# Patient Record
Sex: FEMALE | Race: WHITE | Employment: OTHER | URBAN - METROPOLITAN AREA
[De-identification: names, ages, dates, MRNs, and addresses within clinical notes are randomized per-mention and may not be internally consistent; named-entity substitution may affect disease eponyms.]

---

## 2020-09-22 ENCOUNTER — OFFICE VISIT (OUTPATIENT)
Dept: ORTHOPEDIC SURGERY | Age: 65
End: 2020-09-22
Payer: MEDICARE

## 2020-09-22 VITALS — RESPIRATION RATE: 17 BRPM | WEIGHT: 205 LBS | HEIGHT: 70 IN | BODY MASS INDEX: 29.35 KG/M2

## 2020-09-22 PROBLEM — S60.459A FOREIGN BODY OF FINGER OF RIGHT HAND: Status: ACTIVE | Noted: 2020-09-22

## 2020-09-22 PROBLEM — M19.049 HAND ARTHRITIS: Status: ACTIVE | Noted: 2020-09-22

## 2020-09-22 PROCEDURE — 1123F ACP DISCUSS/DSCN MKR DOCD: CPT | Performed by: ORTHOPAEDIC SURGERY

## 2020-09-22 PROCEDURE — 3017F COLORECTAL CA SCREEN DOC REV: CPT | Performed by: ORTHOPAEDIC SURGERY

## 2020-09-22 PROCEDURE — 99203 OFFICE O/P NEW LOW 30 MIN: CPT | Performed by: ORTHOPAEDIC SURGERY

## 2020-09-22 PROCEDURE — 4004F PT TOBACCO SCREEN RCVD TLK: CPT | Performed by: ORTHOPAEDIC SURGERY

## 2020-09-22 PROCEDURE — G8400 PT W/DXA NO RESULTS DOC: HCPCS | Performed by: ORTHOPAEDIC SURGERY

## 2020-09-22 PROCEDURE — G8417 CALC BMI ABV UP PARAM F/U: HCPCS | Performed by: ORTHOPAEDIC SURGERY

## 2020-09-22 PROCEDURE — G8427 DOCREV CUR MEDS BY ELIG CLIN: HCPCS | Performed by: ORTHOPAEDIC SURGERY

## 2020-09-22 PROCEDURE — 1090F PRES/ABSN URINE INCON ASSESS: CPT | Performed by: ORTHOPAEDIC SURGERY

## 2020-09-22 PROCEDURE — 4040F PNEUMOC VAC/ADMIN/RCVD: CPT | Performed by: ORTHOPAEDIC SURGERY

## 2020-09-22 RX ORDER — ESTRADIOL 0.1 MG/G
CREAM VAGINAL
COMMUNITY
Start: 2020-07-23

## 2020-09-22 RX ORDER — OMEGA-3/DHA/EPA/FISH OIL 60 MG-90MG
500 CAPSULE ORAL 4 TIMES DAILY
COMMUNITY

## 2020-09-22 RX ORDER — HYDROCHLOROTHIAZIDE 12.5 MG/1
CAPSULE, GELATIN COATED ORAL
COMMUNITY
Start: 2020-07-24

## 2020-09-22 RX ORDER — BUPROPION HYDROCHLORIDE 150 MG/1
TABLET ORAL
COMMUNITY
Start: 2020-09-18 | End: 2020-10-21

## 2020-09-22 NOTE — PROGRESS NOTES
Chief Complaint  Hand Pain (NP RT HAND)      History of Present Illness:  Rico Fontaine is a 72 y.o. female. She presents today for a new hand surgery specialty evaluation regarding her right hand. She has had a long history of increasing prominence over the radial aspect of the right thumb at the IP joint which will cause intermittent flareups and some ridging into the nail plate. However, more recently she remembers having a thorn penetrate the dorsum of the right small finger just proximal to the PIP joint around 6 or 8 weeks ago. She thought she got most of the thorn out but can feel a remnant of prominence just proximal to the PIP level on the dorsal aspect. This is very uncomfortable with gripping and with firm pressure. She has not had any recent infectious symptoms. Medical History  Patient's medications, allergies, past medical, surgical, social and family histories were reviewed and updated as appropriate. Review of Systems  Pertinent items are noted in HPI  Denies fever, chills, confusion, bowel/bladder active change. Review of systems reviewed from Patient History Form dated on 9/22/20 and available in the patient's chart under the Media tab. Vital Signs  Vitals:    09/22/20 1314   Resp: 17       General Exam:   Constitutional: Patient is adequately groomed with no evidence of malnutrition  Mental Status: The patient is oriented to time, place and person. The patient's mood and affect are appropriate. Lymphatic: The lymphatic examination bilaterally reveals all areas to be without enlargement or induration. Neurological: The patient has good coordination. There is no weakness or sensory deficit. Hand Examination  Inspection: There is an area of prominence which is visualized and longitudinal in a transverse orientation just proximal to the right small finger PIP level.   There is also prominence consistent with osteoarthritic bone spurring along the dorsal IP region of the right thumb. Palpation: There is tenderness both over the dorsal right thumb IP region and also just proximal to the PIP level of the right small finger. There is no warmth or sign of active infection to the PIP of the small digit. Range of Motion: Full range of motion of the small finger without triggering. At the right thumb she lacks the final 20 degrees of maximum IP flexion. Strength: Normal    Special Tests: Ligament stressing reveals good integrity at all IP joints    Skin: There are no additional worrisome rashes, ulcerations or lesions. Gait: normal    Circulation: well perfused    Additional Comments:     Additional Examinations:  Left Upper Extremity: Examination of the left upper extremity does not show any tenderness, deformity or injury. Range of motion is unremarkable. There is no gross instability. There are no rashes, ulcerations or lesions. Strength and tone are normal.       Radiology:     X-rays obtained and reviewed in office:  Views 3  Location right hand  Impression x-rays demonstrate no sign of any acute fracture to the small finger with a concentric PIP joint. X-rays do demonstrate prominent bone spur formation along the IP joint dorsally, mostly along the radial aspect      Assessment: Combination of likely retained foreign body to the right small finger, just proximal to the PIP joint, and right thumb dorsal bone spur and arthritis    Impression:   Encounter Diagnoses   Name Primary?  Hand pain, right Yes    Hand arthritis     Foreign body of finger of right hand, initial encounter        Office Procedures:  Orders Placed This Encounter   Procedures    XR HAND RIGHT (MIN 3 VIEWS)     Order Specific Question:   Reason for exam:     Answer:   pain       Treatment Plan: I discussed the options with the patient with respect to each diagnosis.   At this juncture I do feel be shepherd to consider surgical exploration with excision of any retained foreign material or foreign body granulation tissue. At the same setting, she would like me to move forward with excision of the dorsal bone spur along the radial aspect of the right thumb IP joint. She would like to have some sedation at the time of surgery. She does understand the relative chance of recurrence with bone spur formation in settings of osteoarthritis. We discussed the risks, benefits, limitations, alternatives, and postop recovery following the proposed procedure. We will begin the process of scheduling surgery if there are no other preoperative medical contraindications. Please note that this transcription was created using voice recognition software. Any errors are unintentional and may be due to voice recognition transcription.

## 2020-10-12 ENCOUNTER — TELEPHONE (OUTPATIENT)
Dept: ORTHOPEDIC SURGERY | Age: 65
End: 2020-10-12

## 2020-10-12 NOTE — TELEPHONE ENCOUNTER
Auth: NPR  Date: 10/26/2020  Reference # None  Spoke with: None  Type of SX: Outpatient  Location: [facility]  CPT [id number], [id number]   SX area: Rt hand  Insurance: Medicare A&B

## 2020-10-20 ENCOUNTER — OFFICE VISIT (OUTPATIENT)
Dept: PRIMARY CARE CLINIC | Age: 65
End: 2020-10-20
Payer: MEDICARE

## 2020-10-20 PROCEDURE — 99211 OFF/OP EST MAY X REQ PHY/QHP: CPT | Performed by: NURSE PRACTITIONER

## 2020-10-20 PROCEDURE — G8417 CALC BMI ABV UP PARAM F/U: HCPCS | Performed by: NURSE PRACTITIONER

## 2020-10-20 PROCEDURE — G8428 CUR MEDS NOT DOCUMENT: HCPCS | Performed by: NURSE PRACTITIONER

## 2020-10-20 NOTE — PROGRESS NOTES
Barbara Gallagher received a viral test for COVID-19. They were educated on isolation and quarantine as appropriate. For any symptoms, they were directed to seek care from their PCP, given contact information to establish with a doctor, directed to an urgent care or the emergency room.

## 2020-10-20 NOTE — PATIENT INSTRUCTIONS

## 2020-10-22 LAB — SARS-COV-2, NAA: NOT DETECTED

## 2020-10-22 NOTE — RESULT ENCOUNTER NOTE

## 2020-10-23 ENCOUNTER — ANESTHESIA EVENT (OUTPATIENT)
Dept: OPERATING ROOM | Age: 65
End: 2020-10-23
Payer: MEDICARE

## 2020-10-25 NOTE — H&P
daily      Irbesartan (AVAPRO PO) Take 150 mg by mouth daily       latanoprost (XALATAN) 0.005 % ophthalmic solution Place 1 drop into both eyes nightly       levothyroxine (SYNTHROID) 100 MCG tablet Take 75 mcg by mouth daily          The patient was counseled at length about the risks of benita Covid-19 during their perioperative period and any recovery window from their procedure. The patient was made aware that benita Covid-19  may worsen their prognosis for recovering from their procedure  and lend to a higher morbidity and/or mortality risk. All material risks, benefits, and reasonable alternatives including postponing the procedure were discussed. The patient does wish to proceed with the procedure at this time.           Rajan García 134

## 2020-10-25 NOTE — ANESTHESIA PRE PROCEDURE
as needed for Pain   Brimonidine Tartrate (ALPHAGAN P OP) Apply to eye daily Indications: to both eyes   FLUTICASONE PROPIONATE, NASAL, NA by Nasal route as needed   vitamin E 400 UNIT capsule Take 400 Units by mouth every other day Indications: occaisionally rubs on face   Glucosamine-Chondroitin (GLUCOSAMINE CHONDR COMPLEX PO) Take 1 tablet by mouth daily   Probiotic Product (PROBIOTIC DAILY PO) Take 1 tablet by mouth daily   Irbesartan (AVAPRO PO) Take 150 mg by mouth daily    latanoprost (XALATAN) 0.005 % ophthalmic soln Place 1 drop into both eyes nightly    levothyroxine (SYNTHROID) 100 MCG tablet Take 75 mcg by mouth daily        Allergies:     Cipro Xr      REDNESS AND ITCHING AT IV SITE    Codeine Nausea And Vomiting     Problem List:     Diverticulitis large intestine K57.32    Diverticulitis of colon K57.32    Nephrolithiasis N20.0    Hypothyroid E03.9    Glaucoma H40.9    Hand arthritis M19.049    Foreign body of finger of right hand S60.459A     Past Medical History:     Bowel obstruction (HCC)     tendency to twist and obstruct due to scar tissue    CHF (congestive heart failure) (HCC)     narcotic induced    Diverticulitis of colon     Glaucoma     Hypertension     Kidney stone     Thyroid disease      Past Surgical History:     ABDOMINAL EXPLORATION SURGERY  11    exploratory laparotomy lysis of adhesions sigmoid colectomy appendectomy and cholecystectomy with epidural for pain control     SECTION      COLONOSCOPY  2016    tics    OTHER SURGICAL HISTORY  2011    cysto r ureteroscopy stone extraction and stent placement    WISDOM TOOTH EXTRACTION       Social History:     Smoking status: Never Smoker    Smokeless tobacco: Never Used   Substance Use Topics    Alcohol use:  Yes     Alcohol/week: 0.0 - 1.0 standard drinks     Vital Signs (Current):     BP: 132/69  Pulse: 68    Resp: 16  SpO2: 98    Temp: 97.2 °F (36.2 °C)    Height: 5' 9\" (1.753 m)  (10/26/20)  Weight: 215 lb (97.5 kg)  (10/26/20)    BMI: 31.8            BP Readings from Last 3 Encounters:   08/24/16 110/65     NPO Status: >8 hrs                     BMI:   Wt Readings from Last 3 Encounters:   09/22/20 205 lb (93 kg)   08/24/16 205 lb (93 kg)   04/07/11 193 lb (87.5 kg)     Body mass index is 31.75 kg/m². POC Tests: -19 Screening (If Applicable): COVID19 NOT DETECTED 10/20/2020     Anesthesia Evaluation  Patient summary reviewed and Nursing notes reviewed no history of anesthetic complications:   Airway: Mallampati: II  TM distance: >3 FB   Neck ROM: full  Mouth opening: > = 3 FB Dental:          Pulmonary: breath sounds clear to auscultation      (-) COPD, asthma, recent URI, sleep apnea, wheezes and not a current smoker                           Cardiovascular:  Exercise tolerance: good (>4 METS),   (+) hypertension:,     (-) past MI,  angina,  DENTON and murmur    ECG reviewed  Rhythm: regular  Rate: normal  Echocardiogram reviewed                  Neuro/Psych:      (-) seizures, TIA and CVA           GI/Hepatic/Renal:   (+) GERD: well controlled, renal disease: kidney stones,      (-) hepatitis and liver disease       Endo/Other:    (+) hypothyroidism: arthritis: OA., .    (-) diabetes mellitus               Abdominal:           Vascular:     - DVT and PE. Anesthesia Plan      MAC and TIVA     ASA 3       Induction: intravenous. MIPS: Prophylactic antiemetics administered. Anesthetic plan and risks discussed with patient. Plan discussed with CRNA.             Vidal Beckman MD

## 2020-10-25 NOTE — OP NOTE
[facility]  Procedure Note  [facility] ARIS Gooden  10/26/2020    Pre-operative Diagnosis:Right Thumb Bone Spur; Right Small Finger Dorsal PIP Foreign Body    Post-operative Diagnosis: Same    Procedure:  1. Excision Right Thumb  bone spur      2. Excision Right Small Finger Dorsal PIP Foreign Body    Surgeon: Scar LAWSON    Anesthesia:  Local/MAC    Complications:  None    EBL: less than 10cc      INDICATIONS FOR PROCEDURE: The patient has clinical evidence of right thumb dorsal bone spur and right small finger foreign body and has failed appropriate conservative management. The patient understands the relevant risks, benefits, limitations, chance of recurrence and healing process of the procedure. PROCEDURE The patient was brought to the operating room and anesthetized with [address] anesthesia. The identified and marked extremity was then prepped and draped in a standard fashion. The finger or arm was exsanguinated with a tourniquet. A standard incision was made longitudinally at the right thumb over the dorsal prominence. Dissection carried down through skin, subcutaneous tissue, and tendinous structures were protected carefully. Dissection was performed around the ganglion. The cyst and its stalk was then excised carefully, with attention paid to protect the tendon and nail matrix at all times. The cyst was then sent for formal pathology. A dorsal bone spur was then identified and carefully excised with a combination of #69 [place] blade and small rongeurs. Improved, smoothed DIP joint contour was achieved. No further cyst material or other abnormalities were seen. Next, a standard separate extensile incision was made longitudinally over the right small finger prominence. Dissection carried down through skin, subcutaneous tissue, and neurovascular and tendinous structures were protected carefully.   Dissection carried down to the deep layers beneath the subcutaneous tissue. Dissection was performed around the mass. The structure was then excised carefully, with attention paid to protect the tendons and neurovascular structures at all times. It had features of likely remnant of plant thorn and reactive tissue surrounding. The extensor tendon appeared to remain intact. The mass was then sent for formal pathology. No further pathologic material or other abnormalities were seen. The tourniquet was released and hemostasis achieved with bipolar electrocautery. Each wound was irrigated with sterile saline. Skin was closed with nylon suture. A soft, bulky dressing and splint was applied and the patient transported to the recovery area in stable condition with good perfusion to all fingertips.           Rajan García 134

## 2020-10-26 ENCOUNTER — HOSPITAL ENCOUNTER (OUTPATIENT)
Age: 65
Setting detail: OUTPATIENT SURGERY
Discharge: HOME | End: 2020-10-26
Attending: ORTHOPAEDIC SURGERY | Admitting: ORTHOPAEDIC SURGERY
Payer: MEDICARE

## 2020-10-26 ENCOUNTER — ANESTHESIA (OUTPATIENT)
Dept: OPERATING ROOM | Age: 65
End: 2020-10-26
Payer: MEDICARE

## 2020-10-26 VITALS
OXYGEN SATURATION: 95 % | HEIGHT: 69 IN | WEIGHT: 215 LBS | BODY MASS INDEX: 31.84 KG/M2 | HEART RATE: 59 BPM | RESPIRATION RATE: 16 BRPM | DIASTOLIC BLOOD PRESSURE: 73 MMHG | TEMPERATURE: 97 F | SYSTOLIC BLOOD PRESSURE: 127 MMHG

## 2020-10-26 VITALS
SYSTOLIC BLOOD PRESSURE: 125 MMHG | OXYGEN SATURATION: 96 % | DIASTOLIC BLOOD PRESSURE: 67 MMHG | RESPIRATION RATE: 7 BRPM

## 2020-10-26 PROCEDURE — 2500000003 HC RX 250 WO HCPCS: Performed by: NURSE ANESTHETIST, CERTIFIED REGISTERED

## 2020-10-26 PROCEDURE — 3700000000 HC ANESTHESIA ATTENDED CARE: Performed by: ORTHOPAEDIC SURGERY

## 2020-10-26 PROCEDURE — 88311 DECALCIFY TISSUE: CPT

## 2020-10-26 PROCEDURE — 7100000010 HC PHASE II RECOVERY - FIRST 15 MIN: Performed by: ORTHOPAEDIC SURGERY

## 2020-10-26 PROCEDURE — 3600000003 HC SURGERY LEVEL 3 BASE: Performed by: ORTHOPAEDIC SURGERY

## 2020-10-26 PROCEDURE — 3700000001 HC ADD 15 MINUTES (ANESTHESIA): Performed by: ORTHOPAEDIC SURGERY

## 2020-10-26 PROCEDURE — 6360000002 HC RX W HCPCS: Performed by: ORTHOPAEDIC SURGERY

## 2020-10-26 PROCEDURE — 88304 TISSUE EXAM BY PATHOLOGIST: CPT

## 2020-10-26 PROCEDURE — 2580000003 HC RX 258: Performed by: ORTHOPAEDIC SURGERY

## 2020-10-26 PROCEDURE — 7100000011 HC PHASE II RECOVERY - ADDTL 15 MIN: Performed by: ORTHOPAEDIC SURGERY

## 2020-10-26 PROCEDURE — 2709999900 HC NON-CHARGEABLE SUPPLY: Performed by: ORTHOPAEDIC SURGERY

## 2020-10-26 PROCEDURE — 3600000013 HC SURGERY LEVEL 3 ADDTL 15MIN: Performed by: ORTHOPAEDIC SURGERY

## 2020-10-26 PROCEDURE — 2580000003 HC RX 258: Performed by: ANESTHESIOLOGY

## 2020-10-26 PROCEDURE — 6360000002 HC RX W HCPCS: Performed by: NURSE ANESTHETIST, CERTIFIED REGISTERED

## 2020-10-26 PROCEDURE — 2500000003 HC RX 250 WO HCPCS: Performed by: ORTHOPAEDIC SURGERY

## 2020-10-26 RX ORDER — IBUPROFEN 800 MG/1
800 TABLET ORAL EVERY 8 HOURS PRN
Qty: 60 TABLET | Refills: 1 | Status: SHIPPED | OUTPATIENT
Start: 2020-10-26

## 2020-10-26 RX ORDER — MIDAZOLAM HYDROCHLORIDE 1 MG/ML
INJECTION INTRAMUSCULAR; INTRAVENOUS PRN
Status: DISCONTINUED | OUTPATIENT
Start: 2020-10-26 | End: 2020-10-26 | Stop reason: SDUPTHER

## 2020-10-26 RX ORDER — MAGNESIUM HYDROXIDE 1200 MG/15ML
LIQUID ORAL CONTINUOUS PRN
Status: COMPLETED | OUTPATIENT
Start: 2020-10-26 | End: 2020-10-26

## 2020-10-26 RX ORDER — OXYCODONE HYDROCHLORIDE 5 MG/1
5 TABLET ORAL EVERY 8 HOURS PRN
Qty: 10 TABLET | Refills: 0 | Status: SHIPPED | OUTPATIENT
Start: 2020-10-26 | End: 2020-11-02

## 2020-10-26 RX ORDER — LIDOCAINE HYDROCHLORIDE 20 MG/ML
INJECTION, SOLUTION INFILTRATION; PERINEURAL PRN
Status: DISCONTINUED | OUTPATIENT
Start: 2020-10-26 | End: 2020-10-26 | Stop reason: SDUPTHER

## 2020-10-26 RX ORDER — PROPOFOL 10 MG/ML
INJECTION, EMULSION INTRAVENOUS PRN
Status: DISCONTINUED | OUTPATIENT
Start: 2020-10-26 | End: 2020-10-26 | Stop reason: SDUPTHER

## 2020-10-26 RX ORDER — SODIUM CHLORIDE, SODIUM LACTATE, POTASSIUM CHLORIDE, CALCIUM CHLORIDE 600; 310; 30; 20 MG/100ML; MG/100ML; MG/100ML; MG/100ML
INJECTION, SOLUTION INTRAVENOUS CONTINUOUS
Status: DISCONTINUED | OUTPATIENT
Start: 2020-10-26 | End: 2020-10-26 | Stop reason: HOSPADM

## 2020-10-26 RX ORDER — FENTANYL CITRATE 50 UG/ML
INJECTION, SOLUTION INTRAMUSCULAR; INTRAVENOUS PRN
Status: DISCONTINUED | OUTPATIENT
Start: 2020-10-26 | End: 2020-10-26 | Stop reason: SDUPTHER

## 2020-10-26 RX ORDER — BUPIVACAINE HYDROCHLORIDE 5 MG/ML
INJECTION, SOLUTION EPIDURAL; INTRACAUDAL PRN
Status: DISCONTINUED | OUTPATIENT
Start: 2020-10-26 | End: 2020-10-26 | Stop reason: ALTCHOICE

## 2020-10-26 RX ADMIN — LIDOCAINE HYDROCHLORIDE 60 MG: 20 INJECTION, SOLUTION INFILTRATION; PERINEURAL at 07:33

## 2020-10-26 RX ADMIN — FENTANYL CITRATE 100 MCG: 50 INJECTION INTRAMUSCULAR; INTRAVENOUS at 07:29

## 2020-10-26 RX ADMIN — CEFAZOLIN 2 G: 10 INJECTION, POWDER, FOR SOLUTION INTRAVENOUS at 07:28

## 2020-10-26 RX ADMIN — SODIUM CHLORIDE, POTASSIUM CHLORIDE, SODIUM LACTATE AND CALCIUM CHLORIDE: 600; 310; 30; 20 INJECTION, SOLUTION INTRAVENOUS at 07:06

## 2020-10-26 RX ADMIN — MIDAZOLAM HYDROCHLORIDE 2 MG: 2 INJECTION, SOLUTION INTRAMUSCULAR; INTRAVENOUS at 07:28

## 2020-10-26 RX ADMIN — PROPOFOL 100 MG: 10 INJECTION, EMULSION INTRAVENOUS at 07:33

## 2020-10-26 ASSESSMENT — PULMONARY FUNCTION TESTS
PIF_VALUE: 0
PIF_VALUE: 2
PIF_VALUE: 1
PIF_VALUE: 0
PIF_VALUE: 2
PIF_VALUE: 0
PIF_VALUE: 2
PIF_VALUE: 0
PIF_VALUE: 0
PIF_VALUE: 1

## 2020-10-26 ASSESSMENT — PAIN SCALES - GENERAL
PAINLEVEL_OUTOF10: 0

## 2020-10-26 ASSESSMENT — LIFESTYLE VARIABLES: SMOKING_STATUS: 0

## 2020-10-26 ASSESSMENT — PAIN DESCRIPTION - DESCRIPTORS: DESCRIPTORS: DISCOMFORT

## 2020-10-26 ASSESSMENT — PAIN - FUNCTIONAL ASSESSMENT: PAIN_FUNCTIONAL_ASSESSMENT: 0-10

## 2020-10-26 NOTE — ANESTHESIA POSTPROCEDURE EVALUATION
Department of Anesthesiology  Postprocedure Note    Patient: Agustin Carmichael  MRN: [id number]  YOB: 1955  Date of evaluation: 10/26/2020    Procedure Summary     Date:  10/26/20 Room / Location:  [facility] Adan Mcclendon 01 / [facility]    Anesthesia Start:  3145 Anesthesia Stop:  0801    Procedure:  EXCISION FOREIGN BODY RIGHT DORSAL SMALL FINGER AT PROXIMAL INTERPHALANGEAL JOINT, EXCISION DORSAL BONE SPUR RIGHT THUMB (Right Hand) Diagnosis:       Superficial foreign body of finger, initial encounter      (FOREIGN BODY IN RIGHT SMALL FINGER, BONE SPUR RIGHT THUMB)    Surgeon:  Manuel Llamas MD Responsible Provider:  Sandra Ramon MD    Anesthesia Type:  MAC, TIVA ASA Status:  3        Anesthesia Type: MAC, TIVA    Yee Phase I: Yee Score: 10    Yee Phase II: Yee Score: 10    Last vitals: Reviewed and per EMR flowsheets.      Anesthesia Post Evaluation   Anesthetic Problems: no   Cardiovascular System Stable: yes  Respiratory Function: Airway Patent yes  ETT no  Ventilator no  Level of consciousness: awake, alert and oriented  Post-op pain: adequate analgesia  Hydration Adequate: yes  Nausea/Vomiting:no  Other Issues:     Elham Samuel MD

## 2020-10-26 NOTE — PROGRESS NOTES
Discharge instructions reviewed with patient/responsible adult and understanding verbalized. Discharge instructions signed and copies given. Patient discharged home with belongings.   Prescription x 2 sent with pt and/or family

## 2020-11-06 ENCOUNTER — OFFICE VISIT (OUTPATIENT)
Dept: ORTHOPEDIC SURGERY | Age: 65
End: 2020-11-06

## 2020-11-06 PROCEDURE — 99024 POSTOP FOLLOW-UP VISIT: CPT | Performed by: ORTHOPAEDIC SURGERY

## 2020-11-06 NOTE — PROGRESS NOTES
Chief Complaint:  Post-Op Check (PO RIGHT HAND SX 10/12/20)      History of Present of Illness: The patient returns and reports overall doing quite well after the excision of dorsal bone spur from the right thumb and excision of mass from the right small finger. At the time of surgery she did have what appeared to be a foreign body reaction with plant material which would correspond with her history of being punctured by a valentino thorn over the PIP of the small finger. I did review the pathology report with her for both the thumb and the small finger. Review of Systems  Pertinent items are noted in HPI  Denies fever, chills, confusion, bowel/bladder active change. Review of systems reviewed from Patient History Form dated on 9/22/2020 and available in the patient's chart under the Media tab. Examination:  On exam today both digits are healing nicely with good range of motion and no sign of any wound breakdown or infection. Radiology:     X-rays obtained and reviewed in office:  Views    Location    Impression      No orders of the defined types were placed in this encounter. Impression:  Encounter Diagnoses   Name Primary?  Foreign body of finger of right hand, subsequent encounter Yes    Hand arthritis          Treatment Plan: Today we will go ahead and remove sutures, apply Steri-Strips and simple Band-Aids. We talked about wound care and advancement of activities as her comfort allows. I will be happy to see her back on an as-needed basis moving forward. Please note that this transcription was created using voice recognition software. Any errors are unintentional and may be due to voice recognition transcription.

## (undated) DEVICE — GLOVE SURG SZ 65 L12IN FNGR THK94MIL STD WHT LTX FREE

## (undated) DEVICE — PAD,ABDOMINAL,8"X10",ST,LF: Brand: MEDLINE

## (undated) DEVICE — GLOVE SURG SZ 75 L12IN FNGR THK94MIL STD WHT LTX FREE

## (undated) DEVICE — SET GRAV VENT NVENT CK VLV 3 NDL FREE PRT 10 GTT

## (undated) DEVICE — Device

## (undated) DEVICE — WRAP COMPR W1INXL5YD TAN SELF ADH COBAN

## (undated) DEVICE — CATHETER IV 20GA L1.25IN PNK FEP SFTY STR HUB RADPQ DISP

## (undated) DEVICE — SUTURE MCRYL SZ 4-0 L18IN ABSRB UD L19MM PS-2 3/8 CIR PRIM Y496G

## (undated) DEVICE — BANDAGE COMPR W2INXL5YD HI E BGE W/ CLP SURE-WRAP

## (undated) DEVICE — SYRINGE MED 10ML LUERLOCK TIP W/O SFTY DISP

## (undated) DEVICE — NEEDLE HYPO 25GA L1.5IN BLU POLYPR HUB S STL REG BVL STR

## (undated) DEVICE — SUTURE ETHLN SZ 4-0 L18IN NONABSORBABLE BLK L19MM PS-2 3/8 1667H

## (undated) DEVICE — GLOVE,SURG,SENSICARE,ALOE,LF,PF,7: Brand: MEDLINE

## (undated) DEVICE — 3M™ TEGADERM™ TRANSPARENT FILM DRESSING FRAME STYLE, 1624W, 2-3/8 IN X 2-3/4 IN (6 CM X 7 CM), 100/CT 4CT/CASE: Brand: 3M™ TEGADERM™

## (undated) DEVICE — ZIMMER® STERILE DISPOSABLE TOURNIQUET CUFF WITH PLC, DUAL PORT, SINGLE BLADDER, 18 IN. (46 CM)

## (undated) DEVICE — SOLUTION IV 1000ML LAC RINGERS PH 6.5 INJ USP VIAFLX PLAS

## (undated) DEVICE — CORD ES L12FT BPLR FRCP

## (undated) DEVICE — SOLUTION IV IRRIG 500ML 0.9% SODIUM CHL 2F7123

## (undated) DEVICE — STANDARD HYPODERMIC NEEDLE,POLYPROPYLENE HUB: Brand: MONOJECT

## (undated) DEVICE — PADDING CAST W4INXL4YD NONSTERILE COT RAYON MICROPLEATED

## (undated) DEVICE — GOWN,SIRUS,NON REINFRCD,LARGE,SET IN SL: Brand: MEDLINE

## (undated) DEVICE — BLADE OPHTH 180DEG CUT SURF BLU STR SHRP DBL BVL GRINDLESS

## (undated) DEVICE — SET ADMIN PRIMING 7ML L30IN 7.35LB 20 GTT 2ND RLER CLMP